# Patient Record
Sex: MALE | Race: WHITE | NOT HISPANIC OR LATINO | Employment: FULL TIME | URBAN - METROPOLITAN AREA
[De-identification: names, ages, dates, MRNs, and addresses within clinical notes are randomized per-mention and may not be internally consistent; named-entity substitution may affect disease eponyms.]

---

## 2023-03-14 DIAGNOSIS — F41.9 ANXIETY: Primary | ICD-10-CM

## 2023-03-14 RX ORDER — SERTRALINE HYDROCHLORIDE 50 MG/1
50 TABLET, FILM COATED ORAL DAILY
Qty: 90 TABLET | Refills: 0 | Status: SHIPPED | OUTPATIENT
Start: 2023-03-14 | End: 2023-06-01 | Stop reason: SDUPTHER

## 2023-03-15 RX ORDER — SERTRALINE HYDROCHLORIDE 50 MG/1
50 TABLET, FILM COATED ORAL DAILY
Qty: 30 TABLET | Refills: 3 | Status: SHIPPED | OUTPATIENT
Start: 2023-03-15 | End: 2023-07-13

## 2023-06-01 DIAGNOSIS — F41.9 ANXIETY: ICD-10-CM

## 2023-06-01 RX ORDER — SERTRALINE HYDROCHLORIDE 50 MG/1
50 TABLET, FILM COATED ORAL DAILY
Qty: 90 TABLET | Refills: 0 | Status: SHIPPED | OUTPATIENT
Start: 2023-06-01 | End: 2023-09-12 | Stop reason: SDUPTHER

## 2023-09-12 ENCOUNTER — TELEPHONE (OUTPATIENT)
Dept: PRIMARY CARE | Facility: CLINIC | Age: 33
End: 2023-09-12
Payer: COMMERCIAL

## 2023-09-12 DIAGNOSIS — F41.9 ANXIETY: ICD-10-CM

## 2023-09-12 RX ORDER — SERTRALINE HYDROCHLORIDE 50 MG/1
50 TABLET, FILM COATED ORAL DAILY
Qty: 60 TABLET | Refills: 0 | Status: SHIPPED | OUTPATIENT
Start: 2023-09-12 | End: 2023-11-06 | Stop reason: SDUPTHER

## 2023-10-20 ENCOUNTER — APPOINTMENT (OUTPATIENT)
Dept: PRIMARY CARE | Facility: CLINIC | Age: 33
End: 2023-10-20
Payer: COMMERCIAL

## 2023-10-21 PROBLEM — G25.81 RESTLESS LEG SYNDROME: Status: ACTIVE | Noted: 2023-10-21

## 2023-10-21 PROBLEM — F41.9 ANXIETY: Status: ACTIVE | Noted: 2023-10-21

## 2023-10-21 RX ORDER — CYCLOBENZAPRINE HCL 5 MG
5 TABLET ORAL NIGHTLY PRN
COMMUNITY
End: 2023-11-06 | Stop reason: SDUPTHER

## 2023-10-23 ENCOUNTER — OFFICE VISIT (OUTPATIENT)
Dept: NEUROLOGY | Facility: CLINIC | Age: 33
End: 2023-10-23
Payer: COMMERCIAL

## 2023-10-23 DIAGNOSIS — M54.50 CHRONIC RIGHT-SIDED LOW BACK PAIN, UNSPECIFIED WHETHER SCIATICA PRESENT: ICD-10-CM

## 2023-10-23 DIAGNOSIS — G89.29 CHRONIC RIGHT-SIDED LOW BACK PAIN, UNSPECIFIED WHETHER SCIATICA PRESENT: ICD-10-CM

## 2023-10-23 DIAGNOSIS — F41.9 ANXIETY: ICD-10-CM

## 2023-10-23 DIAGNOSIS — G25.81 RESTLESS LEG SYNDROME: Primary | ICD-10-CM

## 2023-10-23 PROCEDURE — 99205 OFFICE O/P NEW HI 60 MIN: CPT | Performed by: PSYCHIATRY & NEUROLOGY

## 2023-10-23 NOTE — PROGRESS NOTES
I had a pleasure of seeing Mr. Prabhakar Mcdonough in neurological consultation today for c/o possible RLS. Also right leg sx. Urgency moving legs at night. Flexeril helps. Certain activities can exacerbate symptoms which usually improve with rest. Symptoms can be waxing and waning in intensity and frequency but are present almost daily. Symptoms are usually mild to moderate, sometimes more severe. No major headaches or neck pain. No speech or language difficulties. No swallowing problems. No permanent focal weakness or numbness in extremities. No changes in bowels or bladder habits. No major gait or balance issues. No visual symptoms.    Review of Systems/ROS. Unless otherwise stated in this report the patient's positive and negative responses for review of systems (ROS) for constitutional, eyes, ENT, cardiovascular, respiratory, gastrointestinal/GI, neurological, psychiatric, genitourinary/, musculoskeletal, and integument systems and related systems to the presenting problem are either as stated in the history of present illness (HPI), or were not pertinent, or were negative for the symptoms and/or complaints related to the presenting medical problem.     On physical examination the patient is fully alert and oriented and does not seem to be in acute distress, very cooperative with examination and history taking. Vital signs are stable. Regular heart rate and rhythm. No respiratory distress. Mood and affect are normal and appropriate. AOx3. Normal judgment and insight. Fund of knowledge WNL.    Head and neck examination shows the neck to be supple, without any carotid bruit, meningeal signs or lymphadenopathy. There is no major focal tenderness in the scalp, skull or cervical region. Range of motion of cervical spine is full. Lhermitte's phenomenon is negative. Face is symmetric with normal sensation, tongue is midline. Visual fields are full to confrontation. Extraocular muscle movements are intact, both in saccadic  and pursuit movements. There is no nystagmus and patient denies any double vision during testing. Patient has a small pupils but they are equal and reactive. Funduscopy deferred secondary to small size of pupils. Hearing is well preserved.    Detailed neurological examination including informal mental status testing, motor examination, sensory examination, rapid alternating movements, and coordination are all in the normal limits. Cranial nerve examination is normal. Speech and language are normal. No significant hearing problems. Motor examination shows normal tone, bulk and strength of the muscles throughout. There is no pronator drift or orbiting. I did not see any tremors or fasciculations. No focal sensory deficits. No apparent trophic skin changes. No cyanosis. Peripheral pulses are present. Abdomen is nondistended. Deep tendon reflexes are 1+ throughout and symmetric. No pathological reflexes. Gait is stable. Tandem walk is normal and Romberg test is negative.     Impression:    LBP with right LE sx (radicular) - MRI L/S spine.    Consider RLE EMG/NCS.    Pain Management (?).    The patient also has symptoms suggestive of restless leg syndrome/RLS, which is relatively common in patients with peripheral neuropathy, and RLS is sometimes associated with iron deficiency obtaining iron studies with ferritin may be helpful. Oftentimes if patient is indeed iron deficient correcting iron can improve RLS symptoms. That all can be done by patient's PCP office, if not done recently. Flexeril 5 mg at bedtime works.    The intensity of the symptoms of RLS or nocturnal myoclonus can be reduced with changing the lifestyle. A balanced diet supplemented with vitamins and iron need to be followed. Patient should avoid food and drinks containing caffeine, such as coffee, tea, and chocolate. Moderate exercise can contribute to better sleep habits.

## 2023-10-31 ENCOUNTER — APPOINTMENT (OUTPATIENT)
Dept: PRIMARY CARE | Facility: CLINIC | Age: 33
End: 2023-10-31
Payer: COMMERCIAL

## 2023-11-06 ENCOUNTER — HOSPITAL ENCOUNTER (OUTPATIENT)
Dept: RADIOLOGY | Facility: HOSPITAL | Age: 33
Discharge: HOME | End: 2023-11-06
Payer: COMMERCIAL

## 2023-11-06 DIAGNOSIS — G25.81 RESTLESS LEG SYNDROME: Primary | ICD-10-CM

## 2023-11-06 DIAGNOSIS — F41.9 ANXIETY: ICD-10-CM

## 2023-11-06 DIAGNOSIS — G89.29 CHRONIC RIGHT-SIDED LOW BACK PAIN, UNSPECIFIED WHETHER SCIATICA PRESENT: ICD-10-CM

## 2023-11-06 DIAGNOSIS — M54.50 CHRONIC RIGHT-SIDED LOW BACK PAIN, UNSPECIFIED WHETHER SCIATICA PRESENT: ICD-10-CM

## 2023-11-06 PROCEDURE — 72148 MRI LUMBAR SPINE W/O DYE: CPT

## 2023-11-06 PROCEDURE — 72148 MRI LUMBAR SPINE W/O DYE: CPT | Performed by: RADIOLOGY

## 2023-11-07 RX ORDER — SERTRALINE HYDROCHLORIDE 50 MG/1
50 TABLET, FILM COATED ORAL DAILY
Qty: 60 TABLET | Refills: 0 | Status: SHIPPED | OUTPATIENT
Start: 2023-11-07 | End: 2024-01-22 | Stop reason: SDUPTHER

## 2023-11-07 RX ORDER — CYCLOBENZAPRINE HCL 5 MG
5 TABLET ORAL NIGHTLY PRN
Qty: 30 TABLET | Refills: 0 | Status: SHIPPED | OUTPATIENT
Start: 2023-11-07 | End: 2023-12-07

## 2024-01-02 ENCOUNTER — OFFICE VISIT (OUTPATIENT)
Dept: NEUROLOGY | Facility: CLINIC | Age: 34
End: 2024-01-02
Payer: COMMERCIAL

## 2024-01-02 VITALS
DIASTOLIC BLOOD PRESSURE: 79 MMHG | WEIGHT: 171.6 LBS | RESPIRATION RATE: 18 BRPM | HEIGHT: 70 IN | TEMPERATURE: 97.5 F | HEART RATE: 66 BPM | SYSTOLIC BLOOD PRESSURE: 129 MMHG | BODY MASS INDEX: 24.57 KG/M2

## 2024-01-02 DIAGNOSIS — G25.81 RLS (RESTLESS LEGS SYNDROME): Primary | ICD-10-CM

## 2024-01-02 PROCEDURE — 1036F TOBACCO NON-USER: CPT | Performed by: PSYCHIATRY & NEUROLOGY

## 2024-01-02 PROCEDURE — 99223 1ST HOSP IP/OBS HIGH 75: CPT | Performed by: PSYCHIATRY & NEUROLOGY

## 2024-01-02 RX ORDER — CYCLOBENZAPRINE HCL 5 MG
5 TABLET ORAL NIGHTLY
COMMUNITY
End: 2024-01-09 | Stop reason: SDUPTHER

## 2024-01-02 NOTE — PROGRESS NOTES
Consultation    Subjective     Prabhakar Mcdonough is a 33 y.o. year old male here for consultation for restless leg syndrome.  Referred by Bobby Eric MD      HPI  Patient saw Dr. Angelo from neurology for back pain and right radicular symptoms.  MRI L-spine was done.   10 years of intermittent low back pain on the right starts on lower back and shoots upward, when standing up. Intense pain.  Not every day. Can last all day.  No triggers. No pain in arms or legs.   No numbness or tingling.   When laying in bed at night he feels an urge to move his legs and walk. No cramping or creepiness.     RLS symptoms started at least 6 years ago.   He moves around in his sleep.   MRI L-spine was reviewed and showed mild degenerative change at L5-S1  Hx of anxiety- on Sertraline.   He works 3rd shift at [employer] as a .  He does not notice any issues with RLS during long plane or car ride or sitting too long.   Review of Systems    Patient Active Problem List   Diagnosis    Anxiety    Restless leg syndrome     No past medical history on file.  Past Surgical History:   Procedure Laterality Date    OTHER SURGICAL HISTORY  01/28/2020    No history of surgery     Social History     Tobacco Use    Smoking status: Not on file    Smokeless tobacco: Not on file   Substance Use Topics    Alcohol use: Not on file     family history includes Anxiety disorder in his brother and mother; Diabetes in an other family member; Hypertension in his father and mother.    Current Outpatient Medications:     sertraline (Zoloft) 50 mg tablet, Take 1 tablet (50 mg) by mouth once daily., Disp: 60 tablet, Rfl: 0  Allergies   Allergen Reactions    Penicillins Unknown     There were no vitals taken for this visit.  Neurological Exam/Physical Exam:    Constitutional: General appearance: no acute distress. Pleasant.   Auscultation of Heart: Regular rate and rhythm, no murmurs, normal S1 and S2.   Carotid Arteries: no bruits  Peripheral Vascular Exam:  No swelling, edema or tenderness to palpation in extremities  Mental status: no distress, alert, interactive and cooperative. Affect is appropriate.   Orientation: oriented to person, oriented to place and oriented to time.   Memory: recent memory intact and remote memory intact.   Attention: normal attention /concentration.   Language: normal comprehension and naming.   Fund of knowledge: Patient displays adequate knowledge of current events.  Eyes: The ophthalmoscopic examination was normal.   Cranial nerve II: Visual fields full to confrontation.   Cranial nerves III, IV, and VI: Pupils round, equally reactive to light; EOMs intact. No nystagmus.   Cranial Nerve V: Facial sensation intact to LT bilaterally.   Cranial nerve VII: no facial droop  Cranial nerve VIII: Hearing is intact  Cranial nerves IX and X: Palate elevates symmetrically.   Cranial nerve XI: Shoulder shrug intact.   Cranial nerve XII: Tongue is midline.  Motor:  Muscle bulk was normal in both upper and lower extremities.    No atrophy.   Strength is normal.   Deep Tendon Reflexes: left biceps 2+ , right biceps 2+, left triceps 2+, right triceps 2+, left brachioradialis 2+, right brachioradialis 2+, left patella 2+, right patella 2+, left ankle jerk 2+, right ankle jerk 2+   Plantar Reflex: Toes downgoing to plantar stimulation on the left. Toes downgoing to plantar stimulation on the right.   Sensory Exam: Normal to vibratory sensation  Coordination:  no limb dystaxia and rapid alternating movements are intact.   Gait:  cautious.         Labs:  CBC:   Lab Results   Component Value Date    WBC 5.8 01/28/2020    HGB 16.1 01/28/2020    HCT 48.8 01/28/2020     01/28/2020     BMP:   Lab Results   Component Value Date     01/28/2020    K 4.1 01/28/2020     01/28/2020    CO2 25 01/28/2020    BUN 23 01/28/2020    CREATININE 0.90 01/28/2020    CALCIUM 10.6 01/28/2020     LFT:   Lab Results   Component Value Date    ALKPHOS 58 01/28/2020     BILITOT 0.7 01/28/2020    PROT 7.8 01/28/2020    ALBUMIN 5.2 (H) 01/28/2020    ALT 18 01/28/2020    AST 14 01/28/2020       Assessment/Plan   Problem List Items Addressed This Visit    None  Visit Diagnoses         Codes    RLS (restless legs syndrome)    -  Primary G25.81        Check iron studies and ferritin.  Consider trial off sertraline as SSRIs can worsen RLS.  Agree with treatment for the back/ lumbar radiculopathy which may be the source. Gabapentin low dose can be tried.  Intermittent.   Check with PCP re: ? Kidney/ flank area for other causes of his right sided flank pain.

## 2024-01-02 NOTE — PATIENT INSTRUCTIONS
"It was a pleasure seeing you today.     Obtain blood work.    Sertraline and other SSRI's can worsen restless legs.  I would consider a trial off this for 2-3 weeks ( your PCP may consider trying a different medication for anxiety) .    To help acting out/ jerking in sleep- you can try to take Melatonin 5mg 1 tab 2 hours before bedtime. Try for one week, if no improvement increase to 10 mg at night.  If this is not helpful in one more week increase to 15 mg at night (this is over the counter).     Please make a follow up appointment as needed for restless legs.     For any urgent issues or needing to speak to a medical assistant please call [phone], option 6 during our office hours Monday-Friday 8am-4pm, and leave a voicemail with your concern.  My office will try to reach back you as soon as possible within 24 (business) hours.  If you have an emergency please call 911 or visit a local urgent care or nearest emergency room.      Please understand that [employer] is a useful communication tool for simple \"normal\" results or a refill request but I would not recommend using this tool for emergent or urgent issues or for conversations with me.  I am happy to ask my staff to rearrange a follow up visit or a virtual visit sooner than requested if appropriate for your care.     "

## 2024-01-09 ENCOUNTER — OFFICE VISIT (OUTPATIENT)
Dept: PRIMARY CARE | Facility: CLINIC | Age: 34
End: 2024-01-09
Payer: COMMERCIAL

## 2024-01-09 VITALS
OXYGEN SATURATION: 99 % | SYSTOLIC BLOOD PRESSURE: 136 MMHG | WEIGHT: 170 LBS | DIASTOLIC BLOOD PRESSURE: 92 MMHG | HEART RATE: 65 BPM | HEIGHT: 70 IN | BODY MASS INDEX: 24.34 KG/M2

## 2024-01-09 DIAGNOSIS — G25.81 RESTLESS LEG SYNDROME: ICD-10-CM

## 2024-01-09 PROCEDURE — 99395 PREV VISIT EST AGE 18-39: CPT | Performed by: STUDENT IN AN ORGANIZED HEALTH CARE EDUCATION/TRAINING PROGRAM

## 2024-01-09 PROCEDURE — 1036F TOBACCO NON-USER: CPT | Performed by: STUDENT IN AN ORGANIZED HEALTH CARE EDUCATION/TRAINING PROGRAM

## 2024-01-09 RX ORDER — CYCLOBENZAPRINE HCL 5 MG
5 TABLET ORAL NIGHTLY
Qty: 90 TABLET | Refills: 3 | Status: SHIPPED | OUTPATIENT
Start: 2024-01-09

## 2024-01-09 NOTE — PROGRESS NOTES
"Subjective   Patient ID: Prabhakar Mcdonough is a 33 y.o. male who presents for Follow-up and Med Refill.    HPI     Presents for yearly follow-up.  Reports has been doing well.  Currently working 6 PM to 6 AM and alternating 3 days/week and 4 days/week.    Does note some very intermittent right upper back to right flank pain.  Had a prior MRI that was reassuring.  Unsure if may have any relation to food.    Restless legs have been doing well    Review of Systems    8 point review of systems is otherwise negative unless mentioned on HPI      Objective   BP (!) 148/102   Pulse 65   Ht 1.778 m (5' 10\")   Wt 77.1 kg (170 lb)   SpO2 99%   BMI 24.39 kg/m²     Physical Exam    General: No acute distress  HEENT: EOMI  CV: Regular rate and rhythm, normal S1 and S2, no murmurs  Pulm: Clear to auscultation bilaterally, no wheezings, rales or rhonchi  Abd: Nondistended  MSK: 5/5 strength in all extremities  Skin: No rashes   Lymphatic: No lymphadenopathy      Assessment/Plan       Restless leg syndrome  -Consider obtaining iron studies in the future  -Discussed different behavioral and pharmacologic therapies. Has already been minimizing caffeine intake. Has not noted a difference in the months he takes sertraline verses the months that he does not take it.   -Has had large improvement with flexeril 5mg qhs as needed, about 90% improved, will continue      Anxiety  -Continue sertraline 50mg daily, previously discussed side effects, has had improvement   -Had planned to just take in winter as that is when episodes of anxiety have been occurring   -Discussed possibly tapering off in the future, would like to wait until has returned to [employer] for work to discuss further.    Very intermittent right-sided back/right flank pain  -Reviewed MRI that was done previously  -Discussed to monitor if there is any relation to fatty food intake, could consider right upper quadrant ultrasound in the future    Slightly elevated blood pressure " reading  -Improved upon recheck  -Monitor     DLD  -Lifestyle modifications      Health maintenance  -Received COVID-19 vaccination     RTC yearly or earlier as needed     This note was dictated by speech recognition. Minor errors in transcription may be present.

## 2024-01-22 DIAGNOSIS — F41.9 ANXIETY: ICD-10-CM

## 2024-01-22 RX ORDER — SERTRALINE HYDROCHLORIDE 50 MG/1
50 TABLET, FILM COATED ORAL DAILY
Qty: 90 TABLET | Refills: 3 | Status: SHIPPED | OUTPATIENT
Start: 2024-01-22 | End: 2025-01-21

## 2025-01-19 DIAGNOSIS — G25.81 RESTLESS LEG SYNDROME: ICD-10-CM

## 2025-01-19 DIAGNOSIS — F41.9 ANXIETY: ICD-10-CM

## 2025-01-20 DIAGNOSIS — G25.81 RESTLESS LEG SYNDROME: ICD-10-CM

## 2025-01-20 DIAGNOSIS — F41.9 ANXIETY: ICD-10-CM

## 2025-01-20 RX ORDER — SERTRALINE HYDROCHLORIDE 50 MG/1
50 TABLET, FILM COATED ORAL DAILY
Qty: 30 TABLET | Refills: 0 | Status: SHIPPED | OUTPATIENT
Start: 2025-01-20 | End: 2026-01-20

## 2025-01-20 RX ORDER — CYCLOBENZAPRINE HCL 5 MG
5 TABLET ORAL NIGHTLY
Qty: 30 TABLET | Refills: 0 | Status: SHIPPED | OUTPATIENT
Start: 2025-01-20

## 2025-01-20 RX ORDER — CYCLOBENZAPRINE HCL 5 MG
5 TABLET ORAL NIGHTLY
Qty: 30 TABLET | Refills: 0 | OUTPATIENT
Start: 2025-01-20

## 2025-01-20 RX ORDER — SERTRALINE HYDROCHLORIDE 50 MG/1
50 TABLET, FILM COATED ORAL DAILY
Qty: 30 TABLET | Refills: 0 | OUTPATIENT
Start: 2025-01-20

## 2025-01-29 ENCOUNTER — HOSPITAL ENCOUNTER (EMERGENCY)
Facility: HOSPITAL | Age: 35
Discharge: HOME | End: 2025-01-29
Payer: COMMERCIAL

## 2025-01-29 VITALS
HEIGHT: 71 IN | RESPIRATION RATE: 20 BRPM | WEIGHT: 180 LBS | DIASTOLIC BLOOD PRESSURE: 90 MMHG | BODY MASS INDEX: 25.2 KG/M2 | HEART RATE: 88 BPM | TEMPERATURE: 98.6 F | SYSTOLIC BLOOD PRESSURE: 135 MMHG | OXYGEN SATURATION: 99 %
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
## 2025-01-29 PROCEDURE — 90714 TD VACC NO PRESV 7 YRS+ IM: CPT | Performed by: NURSE PRACTITIONER

## 2025-01-29 PROCEDURE — 2500000004 HC RX 250 GENERAL PHARMACY W/ HCPCS (ALT 636 FOR OP/ED): Performed by: NURSE PRACTITIONER

## 2025-01-29 PROCEDURE — 90471 IMMUNIZATION ADMIN: CPT | Performed by: NURSE PRACTITIONER

## 2025-01-29 PROCEDURE — 2500000001 HC RX 250 WO HCPCS SELF ADMINISTERED DRUGS (ALT 637 FOR MEDICARE OP): Performed by: NURSE PRACTITIONER

## 2025-01-29 PROCEDURE — 99283 EMERGENCY DEPT VISIT LOW MDM: CPT | Mod: 25

## 2025-01-29 RX ORDER — METRONIDAZOLE 500 MG/1
500 TABLET ORAL EVERY 8 HOURS SCHEDULED
Status: DISCONTINUED | OUTPATIENT
Start: 2025-01-29 | End: 2025-01-29 | Stop reason: HOSPADM

## 2025-01-29 RX ORDER — DOXYCYCLINE HYCLATE 100 MG
100 TABLET ORAL EVERY 12 HOURS SCHEDULED
Status: DISCONTINUED | OUTPATIENT
Start: 2025-01-29 | End: 2025-01-29 | Stop reason: HOSPADM

## 2025-01-29 RX ORDER — DOXYCYCLINE 100 MG/1
100 CAPSULE ORAL 2 TIMES DAILY
Qty: 13 CAPSULE | Refills: 0 | Status: SHIPPED | OUTPATIENT
Start: 2025-01-29 | End: 2025-02-05

## 2025-01-29 RX ORDER — METRONIDAZOLE 500 MG/1
500 TABLET ORAL 3 TIMES DAILY
Qty: 20 TABLET | Refills: 0 | Status: SHIPPED | OUTPATIENT
Start: 2025-01-29 | End: 2025-02-05

## 2025-01-29 RX ADMIN — CLOSTRIDIUM TETANI TOXOID ANTIGEN (FORMALDEHYDE INACTIVATED) AND CORYNEBACTERIUM DIPHTHERIAE TOXOID ANTIGEN (FORMALDEHYDE INACTIVATED) 0.5 ML: 5; 2 INJECTION, SUSPENSION INTRAMUSCULAR at 12:52

## 2025-01-29 RX ADMIN — DOXYCYCLINE HYCLATE 100 MG: 100 TABLET, COATED ORAL at 11:36

## 2025-01-29 ASSESSMENT — LIFESTYLE VARIABLES
EVER FELT BAD OR GUILTY ABOUT YOUR DRINKING: NO
HAVE YOU EVER FELT YOU SHOULD CUT DOWN ON YOUR DRINKING: NO
EVER HAD A DRINK FIRST THING IN THE MORNING TO STEADY YOUR NERVES TO GET RID OF A HANGOVER: NO
TOTAL SCORE: 0
HAVE PEOPLE ANNOYED YOU BY CRITICIZING YOUR DRINKING: NO

## 2025-01-29 ASSESSMENT — COLUMBIA-SUICIDE SEVERITY RATING SCALE - C-SSRS
1. IN THE PAST MONTH, HAVE YOU WISHED YOU WERE DEAD OR WISHED YOU COULD GO TO SLEEP AND NOT WAKE UP?: NO
6. HAVE YOU EVER DONE ANYTHING, STARTED TO DO ANYTHING, OR PREPARED TO DO ANYTHING TO END YOUR LIFE?: NO
2. HAVE YOU ACTUALLY HAD ANY THOUGHTS OF KILLING YOURSELF?: NO

## 2025-01-29 ASSESSMENT — PAIN - FUNCTIONAL ASSESSMENT: PAIN_FUNCTIONAL_ASSESSMENT: 0-10

## 2025-01-29 ASSESSMENT — PAIN SCALES - GENERAL
PAINLEVEL_OUTOF10: 0 - NO PAIN
PAINLEVEL_OUTOF10: 0 - NO PAIN
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
## 2025-01-29 NOTE — DISCHARGE INSTRUCTIONS
DISCHARGE HOME PLAN:  -- Complete course of Doxycycline 100 mg twice daily for 7 days (initial dose given in the ED)  -- Complete course of Metronidazole 500 mg 3 times a day for 7 days (initial dose given in the ED)  -- May take over-the-counter Tylenol and/or Ibuprofen as needed for pain  -- Your Tetanus shot was updated  -- Follow-up with your PCP in 1 week  -- Safe to return to work without restrictions       RETURN TO THE NEAREST EMERGENCY DEPARTMENT WITH WORSENING SYMPTOMS OR NEW CONCERNS [place]

## 2025-02-19 ENCOUNTER — APPOINTMENT (OUTPATIENT)
Dept: PRIMARY CARE | Facility: CLINIC | Age: 35
End: 2025-02-19
Payer: COMMERCIAL

## 2025-02-19 VITALS
WEIGHT: 180 LBS | OXYGEN SATURATION: 98 % | HEIGHT: 71 IN | HEART RATE: 100 BPM | SYSTOLIC BLOOD PRESSURE: 123 MMHG | DIASTOLIC BLOOD PRESSURE: 76 MMHG | BODY MASS INDEX: 25.2 KG/M2

## 2025-02-19 DIAGNOSIS — F41.9 ANXIETY: ICD-10-CM

## 2025-02-19 DIAGNOSIS — G25.81 RESTLESS LEG SYNDROME: ICD-10-CM

## 2025-02-19 DIAGNOSIS — Z30.09 VASECTOMY EVALUATION: ICD-10-CM

## 2025-02-19 DIAGNOSIS — Z00.00 HEALTHCARE MAINTENANCE: Primary | ICD-10-CM

## 2025-02-19 PROCEDURE — 3008F BODY MASS INDEX DOCD: CPT | Performed by: STUDENT IN AN ORGANIZED HEALTH CARE EDUCATION/TRAINING PROGRAM

## 2025-02-19 PROCEDURE — 1036F TOBACCO NON-USER: CPT | Performed by: STUDENT IN AN ORGANIZED HEALTH CARE EDUCATION/TRAINING PROGRAM

## 2025-02-19 PROCEDURE — 99395 PREV VISIT EST AGE 18-39: CPT | Performed by: STUDENT IN AN ORGANIZED HEALTH CARE EDUCATION/TRAINING PROGRAM

## 2025-02-19 RX ORDER — CYCLOBENZAPRINE HCL 10 MG
TABLET ORAL
Qty: 90 TABLET | Refills: 3 | Status: SHIPPED | OUTPATIENT
Start: 2025-02-19

## 2025-02-19 RX ORDER — SERTRALINE HYDROCHLORIDE 50 MG/1
50 TABLET, FILM COATED ORAL DAILY
Qty: 90 TABLET | Refills: 3 | Status: SHIPPED | OUTPATIENT
Start: 2025-02-19 | End: 2026-02-19

## 2025-02-19 ASSESSMENT — PATIENT HEALTH QUESTIONNAIRE - PHQ9
SUM OF ALL RESPONSES TO PHQ9 QUESTIONS 1 AND 2: 0
1. LITTLE INTEREST OR PLEASURE IN DOING THINGS: NOT AT ALL
2. FEELING DOWN, DEPRESSED OR HOPELESS: NOT AT ALL

## 2025-02-19 NOTE — PROGRESS NOTES
"Subjective   Patient ID: Prabhakar Mcdonough is a 34 y.o. male who presents for Annual Exam.    HPI     Presents for annual follow-up. Reports has been doing well  Work scheduled has switched back to days  Exercises for an hour each day at work  Gets about 120g of protein a day and 1800 calories a day    Review of Systems    8 point review of systems is otherwise negative unless mentioned on HPI      Objective   /76 (BP Location: Left arm, Patient Position: Sitting, BP Cuff Size: Adult)   Pulse 100   Ht 1.803 m (5' 11\")   Wt 81.6 kg (180 lb)   SpO2 98%   BMI 25.10 kg/m²     Physical Exam    General: No acute distress  HEENT: EOMI  CV: Regular rate and rhythm, normal S1 and S2, no murmurs  Pulm: Clear to auscultation bilaterally, no wheezings, rales or rhonchi  Abd: Nondistended  MSK: 5/5 strength in all extremities  Skin: No rashes   Lymphatic: No lymphadenopathy      Assessment/Plan       Restless leg syndrome  -Consider obtaining iron studies in the future  -Discussed different behavioral and pharmacologic therapies. Has already been minimizing caffeine intake. Has not noted a difference in the months he takes sertraline verses the months that he does not take it.   -Has had large improvement with flexeril 5mg qhs as needed, about 90% improved, will continue      Anxiety  -Continue sertraline 50mg daily, previously discussed side effects, has had improvement   -Had planned to just take in winter as that is when episodes of anxiety have been occurring   -Discussed possibly tapering off in the future, would like to wait until has returned to [employer] for work to discuss further.     Very intermittent right-sided back/right flank pain  -Reviewed MRI that was done previously  -Discussed to monitor if there is any relation to fatty food intake, could consider right upper quadrant ultrasound in the future     Slightly elevated blood pressure reading  -Improved at today's visit      DLD  -Lifestyle modifications "      Health maintenance  -Received COVID-19 vaccination  -Urology referral for vasectomy evaluation     RTC yearly or earlier as needed     This note was dictated by speech recognition. Minor errors in transcription may be present.

## 2025-03-24 ENCOUNTER — APPOINTMENT (OUTPATIENT)
Dept: UROLOGY | Facility: CLINIC | Age: 35
End: 2025-03-24
Payer: COMMERCIAL

## 2025-04-01 NOTE — PROGRESS NOTES
Subjective   Patient ID: Prabhakar Mcdonough is a 34 y.o. male who presents for A VASECTOMY CONSULT.  Pt does not have any children.  HE AND HIS WIFE ARE ABSOLUTELY SURE THEY DO NOT WANT ANY CHILDREN.  THEY HAVE BEEN TOGETHER FOR OVER 13 YEARS       OBJECTIVE  General-- well-developed, well-nourished in NAD  Head-- normal cephalic, atraumatic  Eyes-- PERRL, EOM'S FROM, no jaundice  Neck-- Supple, without masses  Chest-- Normal bony structure  Testis-- both down, non-tender, without masses  Epididymis-- no masses palpable  Scrotum -- no hydrocele noted    ASSESSMENT / PLAN  A:  PT DESIRES A VASECTOMY  PROCEDURE, POSSIBLE COMPLICATIONS DISCUSSED IN DETAIL  ALL QUESTIONS ANSWERED   P:  PT WILL CALL TO SCHEDULE WHEN READY   XANAX 0.5 MG PRE OP  Yoan Nunez MD 04/01/25 2:33 PM

## 2025-04-07 ENCOUNTER — APPOINTMENT (OUTPATIENT)
Dept: UROLOGY | Facility: CLINIC | Age: 35
End: 2025-04-07
Payer: COMMERCIAL

## 2025-04-09 ENCOUNTER — OFFICE VISIT (OUTPATIENT)
Dept: UROLOGY | Facility: CLINIC | Age: 35
End: 2025-04-09
Payer: COMMERCIAL

## 2025-04-09 VITALS
HEART RATE: 93 BPM | SYSTOLIC BLOOD PRESSURE: 134 MMHG | TEMPERATURE: 98.8 F | WEIGHT: 180 LBS | DIASTOLIC BLOOD PRESSURE: 77 MMHG | RESPIRATION RATE: 18 BRPM | BODY MASS INDEX: 25.2 KG/M2 | HEIGHT: 71 IN

## 2025-04-09 DIAGNOSIS — Z30.09 VASECTOMY EVALUATION: ICD-10-CM

## 2025-04-09 PROCEDURE — 99212 OFFICE O/P EST SF 10 MIN: CPT | Performed by: UROLOGY

## 2025-04-09 PROCEDURE — 99202 OFFICE O/P NEW SF 15 MIN: CPT | Performed by: UROLOGY

## 2025-04-09 PROCEDURE — 3008F BODY MASS INDEX DOCD: CPT | Performed by: UROLOGY

## 2025-04-09 SDOH — ECONOMIC STABILITY: FOOD INSECURITY: WITHIN THE PAST 12 MONTHS, YOU WORRIED THAT YOUR FOOD WOULD RUN OUT BEFORE YOU GOT MONEY TO BUY MORE.: NEVER TRUE

## 2025-04-09 SDOH — ECONOMIC STABILITY: FOOD INSECURITY: WITHIN THE PAST 12 MONTHS, THE FOOD YOU BOUGHT JUST DIDN'T LAST AND YOU DIDN'T HAVE MONEY TO GET MORE.: NEVER TRUE

## 2025-04-09 ASSESSMENT — ENCOUNTER SYMPTOMS
OCCASIONAL FEELINGS OF UNSTEADINESS: 0
LOSS OF SENSATION IN FEET: 0
DEPRESSION: 0

## 2025-04-09 ASSESSMENT — LIFESTYLE VARIABLES
HOW OFTEN DO YOU HAVE A DRINK CONTAINING ALCOHOL: NEVER
HOW MANY STANDARD DRINKS CONTAINING ALCOHOL DO YOU HAVE ON A TYPICAL DAY: PATIENT DOES NOT DRINK

## 2025-04-09 ASSESSMENT — PAIN SCALES - GENERAL: PAINLEVEL_OUTOF10: 0-NO PAIN

## 2025-04-09 NOTE — LETTER
April 11, 2025     Bobby Eric MD  [address]  [place] [address]    Patient: Prabhakar Mcdonough   YOB: 1990   Date of Visit: 4/9/2025       Dear Dr. Bobby Eric MD:    Thank you for referring Prabhakar Mcdonough to me for evaluation. Below are my notes for this consultation.  If you have questions, please do not hesitate to call me. I look forward to following your patient along with you.       Sincerely,     Yoan Nunez MD      CC: No Recipients  ______________________________________________________________________________________    Subjective  Patient ID: Prabhakar Mcdonough is a 34 y.o. male who presents for A VASECTOMY CONSULT.  Pt does not have any children.  HE AND HIS WIFE ARE ABSOLUTELY SURE THEY DO NOT WANT ANY CHILDREN.  THEY HAVE BEEN TOGETHER FOR OVER 13 YEARS       OBJECTIVE  General-- well-developed, well-nourished in NAD  Head-- normal cephalic, atraumatic  Eyes-- PERRL, EOM'S FROM, no jaundice  Neck-- Supple, without masses  Chest-- Normal bony structure  Testis-- both down, non-tender, without masses  Epididymis-- no masses palpable  Scrotum -- no hydrocele noted    ASSESSMENT / PLAN  A:  PT DESIRES A VASECTOMY  PROCEDURE, POSSIBLE COMPLICATIONS DISCUSSED IN DETAIL  ALL QUESTIONS ANSWERED   P:  PT WILL CALL TO SCHEDULE WHEN READY   XANAX 0.5 MG PRE OP  Yoan Nunez MD 04/01/25 2:33 PM

## 2025-04-09 NOTE — LETTER
April 11, 2025     Bobby Eric MD  [address] [facility], [address]  [facility] [address]    Patient: Prabhakar Mcdonough   YOB: 1990   Date of Visit: 4/9/2025       Dear Dr. Bobby Eric MD:    Thank you for referring Prabhakar Mcdonough to me for evaluation. Below are my notes for this consultation.  If you have questions, please do not hesitate to call me. I look forward to following your patient along with you.       Sincerely,     Yoan Nunez MD      CC: No Recipients  ______________________________________________________________________________________    Subjective  Patient ID: Prabhakar Mcdonough is a 34 y.o. male who presents for A VASECTOMY CONSULT.  Pt does not have any children.  HE AND HIS WIFE ARE ABSOLUTELY SURE THEY DO NOT WANT ANY CHILDREN.  THEY HAVE BEEN TOGETHER FOR OVER 13 YEARS       OBJECTIVE  General-- well-developed, well-nourished in NAD  Head-- normal cephalic, atraumatic  Eyes-- PERRL, EOM'S FROM, no jaundice  Neck-- Supple, without masses  Chest-- Normal bony structure  Testis-- both down, non-tender, without masses  Epididymis-- no masses palpable  Scrotum -- no hydrocele noted    ASSESSMENT / PLAN  A:  PT DESIRES A VASECTOMY  PROCEDURE, POSSIBLE COMPLICATIONS DISCUSSED IN DETAIL  ALL QUESTIONS ANSWERED   P:  PT WILL CALL TO SCHEDULE WHEN READY   XANAX 0.5 MG PRE OP  Yoan Nunez MD 04/01/25 2:33 PM

## 2026-02-20 ENCOUNTER — APPOINTMENT (OUTPATIENT)
Dept: PRIMARY CARE | Facility: CLINIC | Age: 36
End: 2026-02-20
Payer: COMMERCIAL